# Patient Record
Sex: FEMALE | Race: OTHER | NOT HISPANIC OR LATINO | URBAN - METROPOLITAN AREA
[De-identification: names, ages, dates, MRNs, and addresses within clinical notes are randomized per-mention and may not be internally consistent; named-entity substitution may affect disease eponyms.]

---

## 2019-01-01 ENCOUNTER — EMERGENCY (EMERGENCY)
Facility: HOSPITAL | Age: 29
LOS: 1 days | Discharge: HOME | End: 2019-01-01
Attending: EMERGENCY MEDICINE | Admitting: EMERGENCY MEDICINE
Payer: COMMERCIAL

## 2019-01-01 VITALS
OXYGEN SATURATION: 100 % | HEART RATE: 85 BPM | DIASTOLIC BLOOD PRESSURE: 90 MMHG | SYSTOLIC BLOOD PRESSURE: 127 MMHG | TEMPERATURE: 98 F | RESPIRATION RATE: 18 BRPM

## 2019-01-01 DIAGNOSIS — F43.20 ADJUSTMENT DISORDER, UNSPECIFIED: ICD-10-CM

## 2019-01-01 DIAGNOSIS — Z90.49 ACQUIRED ABSENCE OF OTHER SPECIFIED PARTS OF DIGESTIVE TRACT: Chronic | ICD-10-CM

## 2019-01-01 DIAGNOSIS — R69 ILLNESS, UNSPECIFIED: ICD-10-CM

## 2019-01-01 DIAGNOSIS — F10.10 ALCOHOL ABUSE, UNCOMPLICATED: ICD-10-CM

## 2019-01-01 LAB
ALBUMIN SERPL ELPH-MCNC: 5.2 G/DL — HIGH (ref 3.3–5)
ALP SERPL-CCNC: 61 U/L — SIGNIFICANT CHANGE UP (ref 40–120)
ALT FLD-CCNC: 21 U/L — SIGNIFICANT CHANGE UP (ref 4–33)
AMPHET UR-MCNC: NEGATIVE — SIGNIFICANT CHANGE UP
APAP SERPL-MCNC: < 15 UG/ML — LOW (ref 15–25)
APPEARANCE UR: CLEAR — SIGNIFICANT CHANGE UP
AST SERPL-CCNC: 24 U/L — SIGNIFICANT CHANGE UP (ref 4–32)
BARBITURATES UR SCN-MCNC: NEGATIVE — SIGNIFICANT CHANGE UP
BASOPHILS # BLD AUTO: 0.04 K/UL — SIGNIFICANT CHANGE UP (ref 0–0.2)
BASOPHILS NFR BLD AUTO: 0.4 % — SIGNIFICANT CHANGE UP (ref 0–2)
BENZODIAZ UR-MCNC: NEGATIVE — SIGNIFICANT CHANGE UP
BILIRUB SERPL-MCNC: 0.4 MG/DL — SIGNIFICANT CHANGE UP (ref 0.2–1.2)
BILIRUB UR-MCNC: NEGATIVE — SIGNIFICANT CHANGE UP
BLOOD UR QL VISUAL: SIGNIFICANT CHANGE UP
BUN SERPL-MCNC: 9 MG/DL — SIGNIFICANT CHANGE UP (ref 7–23)
CALCIUM SERPL-MCNC: 9.5 MG/DL — SIGNIFICANT CHANGE UP (ref 8.4–10.5)
CANNABINOIDS UR-MCNC: NEGATIVE — SIGNIFICANT CHANGE UP
CHLORIDE SERPL-SCNC: 100 MMOL/L — SIGNIFICANT CHANGE UP (ref 98–107)
CO2 SERPL-SCNC: 16 MMOL/L — LOW (ref 22–31)
COCAINE METAB.OTHER UR-MCNC: POSITIVE — SIGNIFICANT CHANGE UP
COLOR SPEC: SIGNIFICANT CHANGE UP
CREAT SERPL-MCNC: 0.66 MG/DL — SIGNIFICANT CHANGE UP (ref 0.5–1.3)
EOSINOPHIL # BLD AUTO: 0.05 K/UL — SIGNIFICANT CHANGE UP (ref 0–0.5)
EOSINOPHIL NFR BLD AUTO: 0.6 % — SIGNIFICANT CHANGE UP (ref 0–6)
ETHANOL BLD-MCNC: 109 MG/DL — HIGH
GLUCOSE SERPL-MCNC: 99 MG/DL — SIGNIFICANT CHANGE UP (ref 70–99)
GLUCOSE UR-MCNC: NEGATIVE — SIGNIFICANT CHANGE UP
HCG SERPL-ACNC: < 5 MIU/ML — SIGNIFICANT CHANGE UP
HCT VFR BLD CALC: 41.4 % — SIGNIFICANT CHANGE UP (ref 34.5–45)
HGB BLD-MCNC: 13.6 G/DL — SIGNIFICANT CHANGE UP (ref 11.5–15.5)
IMM GRANULOCYTES # BLD AUTO: 0.02 # — SIGNIFICANT CHANGE UP
IMM GRANULOCYTES NFR BLD AUTO: 0.2 % — SIGNIFICANT CHANGE UP (ref 0–1.5)
KETONES UR-MCNC: NEGATIVE — SIGNIFICANT CHANGE UP
LEUKOCYTE ESTERASE UR-ACNC: NEGATIVE — SIGNIFICANT CHANGE UP
LYMPHOCYTES # BLD AUTO: 2.24 K/UL — SIGNIFICANT CHANGE UP (ref 1–3.3)
LYMPHOCYTES # BLD AUTO: 25.1 % — SIGNIFICANT CHANGE UP (ref 13–44)
MCHC RBC-ENTMCNC: 31.6 PG — SIGNIFICANT CHANGE UP (ref 27–34)
MCHC RBC-ENTMCNC: 32.9 % — SIGNIFICANT CHANGE UP (ref 32–36)
MCV RBC AUTO: 96.3 FL — SIGNIFICANT CHANGE UP (ref 80–100)
METHADONE UR-MCNC: NEGATIVE — SIGNIFICANT CHANGE UP
MONOCYTES # BLD AUTO: 0.6 K/UL — SIGNIFICANT CHANGE UP (ref 0–0.9)
MONOCYTES NFR BLD AUTO: 6.7 % — SIGNIFICANT CHANGE UP (ref 2–14)
NEUTROPHILS # BLD AUTO: 5.97 K/UL — SIGNIFICANT CHANGE UP (ref 1.8–7.4)
NEUTROPHILS NFR BLD AUTO: 67 % — SIGNIFICANT CHANGE UP (ref 43–77)
NITRITE UR-MCNC: NEGATIVE — SIGNIFICANT CHANGE UP
NRBC # FLD: 0 — SIGNIFICANT CHANGE UP
OPIATES UR-MCNC: NEGATIVE — SIGNIFICANT CHANGE UP
OXYCODONE UR-MCNC: NEGATIVE — SIGNIFICANT CHANGE UP
PCP UR-MCNC: NEGATIVE — SIGNIFICANT CHANGE UP
PH UR: 6 — SIGNIFICANT CHANGE UP (ref 5–8)
PLATELET # BLD AUTO: 302 K/UL — SIGNIFICANT CHANGE UP (ref 150–400)
PMV BLD: 10.7 FL — SIGNIFICANT CHANGE UP (ref 7–13)
POTASSIUM SERPL-MCNC: 3.7 MMOL/L — SIGNIFICANT CHANGE UP (ref 3.5–5.3)
POTASSIUM SERPL-SCNC: 3.7 MMOL/L — SIGNIFICANT CHANGE UP (ref 3.5–5.3)
PROT SERPL-MCNC: 7.6 G/DL — SIGNIFICANT CHANGE UP (ref 6–8.3)
PROT UR-MCNC: NEGATIVE — SIGNIFICANT CHANGE UP
RBC # BLD: 4.3 M/UL — SIGNIFICANT CHANGE UP (ref 3.8–5.2)
RBC # FLD: 13.6 % — SIGNIFICANT CHANGE UP (ref 10.3–14.5)
SALICYLATES SERPL-MCNC: < 5 MG/DL — LOW (ref 15–30)
SODIUM SERPL-SCNC: 139 MMOL/L — SIGNIFICANT CHANGE UP (ref 135–145)
SP GR SPEC: 1.01 — SIGNIFICANT CHANGE UP (ref 1–1.04)
TSH SERPL-MCNC: 1.61 UIU/ML — SIGNIFICANT CHANGE UP (ref 0.27–4.2)
UROBILINOGEN FLD QL: NORMAL — SIGNIFICANT CHANGE UP
WBC # BLD: 8.92 K/UL — SIGNIFICANT CHANGE UP (ref 3.8–10.5)
WBC # FLD AUTO: 8.92 K/UL — SIGNIFICANT CHANGE UP (ref 3.8–10.5)

## 2019-01-01 PROCEDURE — 99284 EMERGENCY DEPT VISIT MOD MDM: CPT

## 2019-01-01 PROCEDURE — 90792 PSYCH DIAG EVAL W/MED SRVCS: CPT | Mod: GC

## 2019-01-01 NOTE — ED ADULT NURSE NOTE - OBJECTIVE STATEMENT
Received pt in  pt bought in by EMS from [place] over path were pt was climbing the overpass as per EMS, pt calm & cooperative denies Si/Hi/AVh presently emotional reassurance provided safety & comfort measures maintained eval on going.

## 2019-01-01 NOTE — ED PROVIDER NOTE - NSFOLLOWUPINSTRUCTIONS_ED_ALL_ED_FT
Follow up with crisis center as instructed.  PT provided with additional resources from social work department.

## 2019-01-01 NOTE — ED BEHAVIORAL HEALTH ASSESSMENT NOTE - REFERRAL / APPOINTMENT DETAILS
Patient given information for Crisis Center and substance abuse clinics Patient given information for Crisis Center and substance abuse clinics, advised to follow up within 72 hours with [facility] open for walk-ins Monday-Friday, 9am-7pm.

## 2019-01-01 NOTE — ED PROVIDER NOTE - PROGRESS NOTE DETAILS
Nesha: Discussed with patient again.  Patient denied suicidal or homicidal ideations.  Reports drinking 10 drinks last night and taking cocaine.  She remembers climbing the fence, but was not trying to hurt herself.  Patient reports she feels OK and has support system and her brother is coming to pick her up.  Psychiatrist called brother for collateral and further history.  Reviewed psych note in detail. Discussed the need to follow up and offered crisis center info.

## 2019-01-01 NOTE — ED BEHAVIORAL HEALTH ASSESSMENT NOTE - SUMMARY
28 year-old woman with no known psychiatric hospitalizations, suicide attempts, or SIB, no current psychiatrist, taking Zoloft 25 mg prescribed by her PCP for depression, employed at a law office, single, no kids, frequent use of EtOH and occasional use of cocaine and cannabis, who was BIB EMS after a member of the community called 911 fearful for her safety after the patient was spotted attempting to scale a wall on an overpass. The patient is clinically sober at the time of interview. She is debora for safety and denies that she wanted to end her life earlier today while on the overpass. She blames intoxication as the reason for her behavior. She agrees to outpatient follow-up and was given referral information to both the Crisis Center and outpatient substance abuse treatment centers. 28 year-old woman with no known psychiatric hospitalizations, suicide attempts, or SIB, no current psychiatrist, taking Zoloft 25 mg prescribed by her PCP for depression, employed at a law office, single, no kids, frequent use of EtOH and occasional use of cocaine and cannabis, domiciled with mother, who was BIB EMS after a member of the community called 911 fearful for her safety after the patient was spotted attempting to scale a wall on an overpass. The patient is clinically sober at the time of interview. She is debora for safety and denies that she wanted to end her life earlier today while on the overpass. She blames intoxication as the reason for her behavior. She agrees to outpatient follow-up and was given referral information to both the Crisis Center and outpatient substance abuse treatment centers. 28 year-old woman with no known psychiatric hospitalizations, suicide attempts, or SIB, no current psychiatrist, taking Zoloft 25 mg prescribed by her PCP for depression, employed at a law office, single, no kids, frequent use of EtOH and occasional use of cocaine and cannabis, domiciled with mother, who was BIB EMS after a member of the community called 911 fearful for her safety after the patient was spotted attempting to scale a wall on an overpass. The patient is clinically sober at the time of interview. She is debora for safety and denies that she wanted to end her life earlier today while on the overpass. She blames intoxication as the reason for her behavior. She agrees to outpatient follow-up and was given referral information to both the Crisis Center and outpatient substance abuse treatment centers. She declined voluntary admission. She is deemed not to pose a substantial risk of physical harm to herself or others at this time to justify involuntary commitment.

## 2019-01-01 NOTE — ED PROVIDER NOTE - NEUROLOGICAL, MLM
Alert and oriented, no focal deficits, no motor or sensory deficits, ambulating with no acute issues

## 2019-01-01 NOTE — ED ADULT NURSE NOTE - CHIEF COMPLAINT QUOTE
Brought in by ems for suicide attempt. 911 called by bystanders who saw patient attempting to climb over [place] overpass. When police arrived patient ran and fell- abrasion to right knee. Pt endorsed to EMS that she had a suicide plan but was not going to carry through with it. Hx of overdose in 2014. Admits to drinking last night, last drink was 4 hours ago. Denies visual or auditory hallucinations. Seen by Dr. Jeffries in triage and sent to  for eval.

## 2019-01-01 NOTE — ED BEHAVIORAL HEALTH ASSESSMENT NOTE - DESCRIPTION (FIRST USE, LAST USE, QUANTITY, FREQUENCY, DURATION)
First drink at 16 y/o -- has >= 4 drinks about once per month, BAL was 109 on arrival First use at 18, smokes cannabis about once per month First use at 18, does a bump about once per month First drink at 16 y/o -- has >= 4 drinks about once per month, BAL was 109 on arrival. Reports having blacked out before. Denies w/d seizure

## 2019-01-01 NOTE — ED BEHAVIORAL HEALTH ASSESSMENT NOTE - RISK ASSESSMENT
Elevated risk due to substance abuse (EtOH, cocaine), recent financial problems. Protective factors include denying SI/HI, being future-oriented (planning on going to work tomorrow), being employed, having social supports (mother, brother), no known guns at home, no known suicide attempts or SIB, no known psychiatric hospitalizations. Patient agrees to call 911 or return to the ED in the event of SI/HI. Patient is safe for outpatient care. Elevated risk due to substance abuse (EtOH, cocaine), recent financial problems. Protective factors include denying SI/HI, being future-oriented (planning on going to work tomorrow), being employed, having social supports (mother, brother), no known guns at home, no known suicide attempts or SIB, no known psychiatric hospitalizations. Patient agrees to call 911 or return to the ED in the event of SI/HI. Patient is safe for outpatient care. Patient was counseled not to combined benzos and EtOH. Elevated risk due to substance abuse (EtOH, cocaine), recent financial problems, prior treatment of depression. Protective factors include denying SI/HI, being future-oriented (planning on going to work tomorrow), being employed, being domiciled, having social supports (mother, brother), no known guns at home, no known suicide attempts or SIB, no known psychiatric hospitalizations. Patient agrees to call 911 or return to the ED in the event of SI/HI. Patient is safe for outpatient care. Patient was counseled not to combined benzos and EtOH. Low to moderate risk.  Risk factors include substance abuse (EtOH, cocaine), recent financial problems, prior treatment of depression. Protective factors include denying SI/HI, being future-oriented (planning on going to work tomorrow), being employed, being domiciled, having social supports (mother, brother), no known guns at home, no known suicide attempts or SIB, no known psychiatric hospitalizations. Patient agrees to call 911 or return to the ED in the event of SI/HI. Patient is safe for outpatient care. Patient was counseled not to combined benzos and EtOH. Low to moderate risk.  Risk factors include substance abuse (EtOH, cocaine), recent financial problems, prior treatment of depression. Protective factors include denying SI/HI, being future-oriented (planning on going to work tomorrow), being employed, being domiciled, having social supports (mother, brother), no known guns at home, no known suicide attempts or SIB, no known psychiatric hospitalizations. Patient agrees to call 911 or return to the ED in the event of SI/HI. Patient is safe for outpatient care. Patient was counseled not to combined use of benzodiazepines and alcohol.

## 2019-01-01 NOTE — ED PROVIDER NOTE - OBJECTIVE STATEMENT
29 yo F with depression and suicide attempt in 2014 arrived today by EMS for suicide attempt.  Pt was found climbing the fence [place] [address].  Noted to have severely flat affect, reports feeling hopeless and reports of passively wanting to die.  Patient reports being on Zoloft and Ativan which is prescribed by her PMD in [place].  Currently denying any plans to kill her self or hurt herself.  Denies visual or auditory hallucinations, or homicidal ideations.  Reports drinking 10 drinks last night, answering questions with no slurring of words.    Last suicide attempt was a drug overdose in 2014.  Denies chest pain, shortness of breath, dizziness, lightheadedness.

## 2019-01-01 NOTE — ED BEHAVIORAL HEALTH ASSESSMENT NOTE - DESCRIPTION
Denies Single. No kids. Lives with mother. 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 Calm and cooperative  ICU Vital Signs Last 24 Hrs  T(C): 36.6 (01 Jan 2019 10:15), Max: 36.6 (01 Jan 2019 10:15)  T(F): 97.9 (01 Jan 2019 10:15), Max: 97.9 (01 Jan 2019 10:15)  HR: 85 (01 Jan 2019 10:15) (85 - 85)  BP: 127/90 (01 Jan 2019 10:15) (127/90 - 127/90)  BP(mean): --  ABP: --  ABP(mean): --  RR: 18 (01 Jan 2019 10:15) (18 - 18)  SpO2: 100% (01 Jan 2019 10:15) (100% - 100%) Calm and cooperative, no psychomotor abnormalities, no agitation, no prns required.    ICU Vital Signs Last 24 Hrs  T(C): 36.6 (01 Jan 2019 10:15), Max: 36.6 (01 Jan 2019 10:15)  T(F): 97.9 (01 Jan 2019 10:15), Max: 97.9 (01 Jan 2019 10:15)  HR: 85 (01 Jan 2019 10:15) (85 - 85)  BP: 127/90 (01 Jan 2019 10:15) (127/90 - 127/90)  BP(mean): --  ABP: --  ABP(mean): --  RR: 18 (01 Jan 2019 10:15) (18 - 18)  SpO2: 100% (01 Jan 2019 10:15) (100% - 100%)

## 2019-01-01 NOTE — ED ADULT TRIAGE NOTE - CHIEF COMPLAINT QUOTE
Brought in by ems for suicide attempt. 911 called by bystanders who saw patient attempting to climb over [place] overpass. When police arrived patient ran and fell- abrasion to right knee. Pt endorsed to EMS that she had a suicide plan but was not going to carry through with it. Hx of overdose in 2014. Admits to drinking last night, last drink was 4 hours ago. Brought in by ems for suicide attempt. 911 called by bystanders who saw patient attempting to climb over [place] overpass. When police arrived patient ran and fell- abrasion to right knee. Pt endorsed to EMS that she had a suicide plan but was not going to carry through with it. Hx of overdose in 2014. Admits to drinking last night, last drink was 4 hours ago. Denies visual or auditory hallucinations. Brought in by ems for suicide attempt. 911 called by bystanders who saw patient attempting to climb over [place] overpass. When police arrived patient ran and fell- abrasion to right knee. Pt endorsed to EMS that she had a suicide plan but was not going to carry through with it. Hx of overdose in 2014. Admits to drinking last night, last drink was 4 hours ago. Denies visual or auditory hallucinations. Seen by Dr. Jeffries in triage and sent to  for eval.

## 2019-01-01 NOTE — ED BEHAVIORAL HEALTH ASSESSMENT NOTE - OTHER PAST PSYCHIATRIC HISTORY (INCLUDE DETAILS REGARDING ONSET, COURSE OF ILLNESS, INPATIENT/OUTPATIENT TREATMENT)
Was on fluoxetine previously, dose unknown. Stopped two years ago. Was on it for two years. Started Zoloft 25 mg daily 6 months ago. Written by PCP. Says she also has a script for lorazepam 0.5 mg PRN which she uses once per day. No hospitalizations or suicide attempts or SIB. Was on fluoxetine previously, dose unknown. Stopped two years ago. Was on it for two years. Started Zoloft 25 mg daily 6 months ago. Written by PCP. Says she also has a script for lorazepam 0.5 mg PRN which she uses once per day. No hospitalizations or suicide attempts or SIB or rehabs.

## 2019-01-01 NOTE — ED BEHAVIORAL HEALTH ASSESSMENT NOTE - SAFETY PLAN DETAILS
Return to the ED in the event of SI/HI Patient advised to call 911 or immediately return to the ED in the event of SI or HI or if patient should feel unsafe at anytime.

## 2019-01-01 NOTE — ED BEHAVIORAL HEALTH ASSESSMENT NOTE - OTHER
brother member of the community financial stressors EMS runsheet Provided support, reassurance and psychoeducation. EMS runsheet, no results noted on Psyck[facility]

## 2019-01-01 NOTE — ED BEHAVIORAL HEALTH ASSESSMENT NOTE - CASE SUMMARY
28 year-old woman with no known psychiatric hospitalizations, suicide attempts, or SIB, no current psychiatrist, taking Zoloft 25 mg prescribed by her PCP for depression, employed at a law office, single, no kids, frequent use of EtOH and occasional use of cocaine and cannabis, domiciled with mother, who was BIB EMS after a member of the community called 911 fearful for her safety after the patient was spotted attempting to scale a wall on an overpass. The patient is clinically sober at the time of interview. She is debora for safety and denies that she wanted to end her life earlier today while on the overpass. She blames intoxication as the reason for her behavior. She agrees to outpatient follow-up and was given referral information to both the Crisis Center and outpatient substance abuse treatment centers. She declined voluntary admission. She is deemed not to pose a substantial risk of physical harm to herself or others at this time to justify involuntary commitment. 28 year-old single female, domiciled, non-caregiver, employed, with no known psychiatric hospitalizations, suicide attempts, or SIB, no current psychiatrist, taking Zoloft 25 mg prescribed by her PCP for depression, with a history of substance abuse of alcohol, cocaine and cannabis, who was BIB EMS after patient was spotted attempting to scale a wall on an overpass. The patient was admittedly intoxicated at the time of incident but is clinically sober at the time of evaluation.  She is debora for safety and denies acute SI, with no expressed intent or plan.  Patient is future oriented, focused on her need to attend work tomorrow as this is a new job and she's on probation and wants to keep it.  Also states she hopes to help out her family financially and reports she would never consider suicide due to care for her family, specifically her mother.  Patient expresses motivation to follow up with outpatient care at this time, with referral information provided.  Patient is offered but declines voluntary admission.  At this time the patient does not present as an acute risk to self or others and does not meet criteria for involuntary psychiatric hospitalization as per mental health hygiene law.

## 2019-01-01 NOTE — ED BEHAVIORAL HEALTH ASSESSMENT NOTE - HPI (INCLUDE ILLNESS QUALITY, SEVERITY, DURATION, TIMING, CONTEXT, MODIFYING FACTORS, ASSOCIATED SIGNS AND SYMPTOMS)
28 year-old woman with no known psychiatric hospitalizations, suicide attempts, or SIB, no current psychiatrist, taking Zoloft 25 mg prescribed by her PCP for depression, employed at a law office, single, no kids, frequent use of EtOH and occasional use of cocaine and cannabis, who was BIB EMS after a member of the community called 911 fearful for her safety after the patient was spotted attempting to scale a wall on an overpass.  Per the Prehospital Care Report Summary, the patient on the scene made a statement about wanting to die and was seen on the overpass preparing to jump, per reports, but was denying an intent or plan do jump. She was restrained by [facility] and brought to the ED. On interview, the patient states that she went to a party last night and had roughly 10 vodka drinks. She says she left the party to walk home. She denies she wanted to end her life while on the overpass on her way home. She says she's not sure what she was doing, saying, "I was drunk, I don't know, but I don't want to die." She says there have been financial stressors in her life recently. Says losing her father in 2006 was a challenge for her. She denies persistently depressed mood or anhedonia. She denies symptoms of enrique or psychosis. She admits that alcohol is a problem for her. She says it's a 6 out of 10 on a scale of 1-10 (with one being no problem). She says she's at a 10/10 in terms of her readiness to change. She expresses interest in substance treatment. Patient’s brother, Ramon Sanches ([phone]), states that the patient has a history of drug and alcohol abuse. He believes she has used Ketamine in the past but is unsure about other drugs. He says she’s had other “run ins” in the past because of her drinking, saying she’s ended up in emergency rooms twice before because of drinking. He provided the number for his mother, Leticia Sanches ([phone]), with whom the patient lives. However, she works overnight as a nurse and sleeps during the day. I left two voicemails for her. Both the brother and patient deny any history of suicide attempts. 28 year-old woman with no known psychiatric hospitalizations, suicide attempts, or SIB, no current psychiatrist, taking Zoloft 25 mg prescribed by her PCP for depression, employed at a law office, single, no kids, frequent use of EtOH and occasional use of cocaine and cannabis, domiciled with mother, who was BIB EMS after a member of the community called 911 fearful for her safety after the patient was spotted attempting to scale a wall on an overpass.  Per the Prehospital Care Report Summary, the patient on the scene made a statement about wanting to die and was seen on the overpass preparing to jump, per reports, but was denying an intent or plan do jump. She was restrained by [facility] and brought to the ED. On interview, the patient states that she went to a party last night and had roughly 10 vodka drinks. She says she left the party to walk home. She denies she wanted to end her life while on the overpass on her way home. She says she's not sure what she was doing, saying, "I was drunk, I don't know, but I don't want to die." She says there have been financial stressors in her life recently. Says losing her father in 2006 was a challenge for her. She denies persistently depressed mood or anhedonia. She denies symptoms of enrique or psychosis. She admits that alcohol is a problem for her. She says it's a 6 out of 10 on a scale of 1-10 (with one being no problem, 10 big problem). She says she's at a 10/10 in terms of her readiness to change. She expresses interest in outpatient substance treatment. She denies interest in inpatient psychiatric care or rehab. Patient’s brother, Ramon Sanches ([phone]), states that the patient has a history of drug and alcohol abuse. He believes she has used Ketamine in the past but is unsure about other drugs. He says she’s had other “run ins” in the past because of her drinking, saying she’s ended up in emergency rooms twice before because of alcohol intoxication. He provided the number for his mother, Leticia Sanches ([phone]), with whom the patient lives. However, she works overnight as a nurse and sleeps during the day. I left two voicemails for her. Both the brother and patient herself deny any history of suicide attempts. 28 year-old woman with no known psychiatric hospitalizations, suicide attempts, or SIB, no current psychiatrist, taking Zoloft 25 mg prescribed by her PCP for depression, employed at a law office, single, no kids, frequent use of EtOH and occasional use of cocaine and cannabis, domiciled with mother, who was BIB EMS after a member of the community called 911 fearful for her safety after the patient was spotted attempting to scale a wall on an overpass.  Per the Prehospital Care Report Summary, the patient on the scene made a statement about wanting to die and was seen on the overpass preparing to jump, per reports, but was denying an intent or plan do jump. She was restrained by [facility] and brought to the ED. On interview, the patient states that she went to a party last night and had roughly 10 vodka drinks. She says she left the party to walk home. She denies she wanted to end her life while on the overpass on her way home. She says she's not sure what she was doing, saying, "I was drunk, I don't know, but I don't want to die." She says there have been financial stressors in her life recently. Says losing her father in 2006 was a challenge for her. She denies persistently depressed mood or anhedonia. She denies symptoms of enrique or psychosis. She admits that alcohol is a problem for her. She says it's a 6 out of 10 on a scale of 1-10 (with one being no problem, 10 big problem). She says she's at a 10/10 in terms of her readiness to change. She expresses interest in outpatient substance treatment. She denies interest in inpatient psychiatric care or rehab. Patient’s brother, Ramon Sanches ([phone]), states that the patient has a history of drug and alcohol abuse. He believes she has used Ketamine in the past but is unsure about other drugs. He says she’s had other “run ins” in the past because of her drinking, saying she’s ended up in emergency rooms twice before because of alcohol intoxication. He provided the number for his mother, Leticia Sanches ([phone]), with whom the patient lives. However, she works overnight as a nurse and sleeps during the day. I left two voicemails for her. Both the brother and patient herself deny any history of suicide attempts even though the Prehospital Care Report Summary mentions an overdose on unknown pills for which her mother took her to the hospital in 2014. 28 year-old woman with no known psychiatric hospitalizations, suicide attempts, or SIB, no current psychiatrist, taking Zoloft 25 mg prescribed by her PCP for depression, employed at a law office, single, no kids, frequent use of EtOH and occasional use of cocaine and cannabis, domiciled with mother, who was BIB EMS after a member of the community called 911 fearful for her safety after the patient was spotted attempting to scale a wall on an overpass.  Per the Prehospital Care Report Summary, the patient on the scene made a statement about wanting to die and was seen on the overpass preparing to jump, per reports, but was denying an intent or plan do jump. She was restrained by [facility] and brought to the ED. On interview, the patient states that she went to a party last night and had roughly 10 vodka drinks. She says she left the party to walk home. She denies she wanted to end her life while on the overpass on her way home. She says she's not sure what she was doing, saying, "I was drunk, I don't know, but I don't want to die." She says there have been financial stressors in her life recently. Says losing her father in 2006 was a challenge for her. She denies persistently depressed mood or anhedonia. She denies symptoms of enrique or psychosis. She admits that alcohol is a problem for her. She says it's a 6 out of 10 on a scale of 1-10 (with one being no problem, 10 big problem). She says she's at a 10/10 in terms of her readiness to change. She expresses interest in outpatient substance treatment. She denies interest in inpatient psychiatric care or rehab. Patient’s brother, Ramon Sanches ([phone]), states that the patient has a history of drug and alcohol abuse. He believes she has used Ketamine in the past but is unsure about other drugs. He says she’s had other “run ins” in the past because of her drinking, saying she’s ended up in emergency rooms twice before because of alcohol intoxication. He denies any acute safety concerns for the patient and does not believe she needs to be psychiatrically admitted. He provided the number for his mother, Leticia Sanches ([phone]), with whom the patient lives. However, she works overnight as a nurse and sleeps during the day. I left two voicemails for her. Both the brother and patient herself deny any history of suicide attempts even though the Prehospital Care Report Summary mentions an overdose on unknown pills for which her mother took her to the hospital in 2014. 28 year-old woman with no known psychiatric hospitalizations, suicide attempts, or SIB, no current psychiatrist, taking Zoloft 25 mg prescribed by her PCP for depression, employed at a law office, single, no kids, frequent use of EtOH and occasional use of cocaine and cannabis, domiciled with mother, who was BIB EMS after a member of the community called 911 fearful for her safety after the patient was spotted attempting to scale a wall on an overpass.  Per the Prehospital Care Report Summary, the patient on the scene made a statement about wanting to die and was seen on the overpass preparing to jump, per reports, but was denying an intent or plan do jump. She was restrained by [facility] and brought to the ED. On interview, the patient states that she went to a party last night and had roughly 10 vodka drinks. She says she left the party to walk home. She denies she wanted to end her life while on the overpass on her way home. She says she's not sure what she was doing, saying, "I was drunk, I don't know, but I don't want to die." She says there have been financial stressors in her life recently. Says losing her father in 2006 was a challenge for her. She denies persistently depressed mood or anhedonia. She denies symptoms of enrique or psychosis. She admits that alcohol is a problem for her. She says it's a 6 out of 10 on a scale of 1-10 (with one being no problem, 10 big problem). She says she's at a 10/10 in terms of her readiness to change. She expresses interest in outpatient substance treatment. She denies interest in inpatient psychiatric care or rehab. Patient’s brother, Ramon Sanches ([phone]), states that the patient has a history of drug and alcohol abuse. He believes she has used Ketamine in the past but is unsure about other drugs. He says she’s had other “run ins” in the past because of her drinking, saying she’s ended up in emergency rooms twice before because of alcohol intoxication. He denies any acute safety concerns for the patient and does not believe she needs to be psychiatrically admitted. He provided the number for his mother, Leticia Sanches ([phone]), with whom the patient lives. However, she works overnight as a nurse and sleeps during the day. I left two voicemails for her. Of note, the patient's mother later called back to report that alcohol has been a problem for the patient. The referral options of going to the [facility] or a substance treatment clinic were explained to the mother, and she expressed gratitude. The mother denied safety concerns at this time.

## 2019-01-01 NOTE — ED BEHAVIORAL HEALTH ASSESSMENT NOTE - DETAILS
See above plan discussed in no apparent distress, no acute somatic complaints See above, patient denies any history of suicide attempts or self injurious behavior

## 2019-01-01 NOTE — ED PROVIDER NOTE - MEDICAL DECISION MAKING DETAILS
27 yo F a/w depression and found [place], currently calm, now denying suicidal plan.    - check labs, ETOH level  - Psych eval

## 2019-01-01 NOTE — ED ADULT NURSE REASSESSMENT NOTE - NS ED NURSE REASSESS COMMENT FT1
pt calm & cooperative d/c by NP pt denies Si/Hi/AVh presently resources provided to pt upon discharge pt verbalizing understanding.

## 2020-12-11 NOTE — ED ADULT NURSE NOTE - NS TRANSFER PATIENT BELONGINGS
Call patient:  Bone mineral density shows osteopenia, or mild bone loss. Recommend daily weight-bearing exercise, and adequate calcium and vitamin-D intake. Recheck bone mineral density in 2 years.
Clothing